# Patient Record
Sex: MALE | Race: WHITE | NOT HISPANIC OR LATINO | URBAN - METROPOLITAN AREA
[De-identification: names, ages, dates, MRNs, and addresses within clinical notes are randomized per-mention and may not be internally consistent; named-entity substitution may affect disease eponyms.]

---

## 2017-09-13 ENCOUNTER — EMERGENCY (EMERGENCY)
Facility: HOSPITAL | Age: 62
LOS: 1 days | Discharge: LEFT BEFORE DISCHARGE | End: 2017-09-13
Attending: EMERGENCY MEDICINE | Admitting: EMERGENCY MEDICINE
Payer: MEDICAID

## 2017-09-13 VITALS
OXYGEN SATURATION: 100 % | SYSTOLIC BLOOD PRESSURE: 125 MMHG | HEART RATE: 88 BPM | TEMPERATURE: 98 F | RESPIRATION RATE: 16 BRPM | DIASTOLIC BLOOD PRESSURE: 74 MMHG

## 2017-09-13 PROCEDURE — 99284 EMERGENCY DEPT VISIT MOD MDM: CPT | Mod: 25

## 2017-09-13 NOTE — ED ADULT TRIAGE NOTE - CHIEF COMPLAINT QUOTE
pt comes to ED from a bar. pt states he was drinking all night does not know how much he drank tonight. Also the patient needs psych meds refilled. pt does not have a psychiatrist. pt denies si/hi pt states I just need help VSS NAD

## 2017-09-14 VITALS
OXYGEN SATURATION: 100 % | DIASTOLIC BLOOD PRESSURE: 73 MMHG | HEART RATE: 84 BPM | SYSTOLIC BLOOD PRESSURE: 121 MMHG | RESPIRATION RATE: 16 BRPM

## 2017-09-14 RX ORDER — FAMOTIDINE 10 MG/ML
20 INJECTION INTRAVENOUS DAILY
Qty: 0 | Refills: 0 | Status: DISCONTINUED | OUTPATIENT
Start: 2017-09-14 | End: 2017-09-17

## 2017-09-14 RX ADMIN — Medication 30 MILLILITER(S): at 06:04

## 2017-09-14 RX ADMIN — FAMOTIDINE 20 MILLIGRAM(S): 10 INJECTION INTRAVENOUS at 06:03

## 2017-09-14 NOTE — ED PROVIDER NOTE - OBJECTIVE STATEMENT
05:05 Cherie att: 61M BIBEMS etoh requesting med refill. Etoh use, denies daily use, last drink 12 pack of beer 6-7pm. Reports h/o DT. Reports he ran out of seroquel, risperidone and prozac. Has no psychiatrist to refill his meds. Denies f, c, ha, blurry vision, cp, sob, abd pain, n/v/d, dysuria, muscle aches. Denies si/hi/ah/vh. 05:05 Cherie att: 61M BIBEMS etoh requesting med refill. Pmh htn/hypercholesterolemia/GERD/etoh abuse. Pt reports he has not had his psych meds for 2 days and generally drinks when he does not have them. Etoh use, denies daily use, last drink 12 pack of beer 6-7pm. Reports h/o DT. Reports he ran out of seroquel, risperidone and prozac. Has no psychiatrist to refill his meds. Denies f, c, ha, blurry vision, cp, sob, abd pain, n/v/d, dysuria, muscle aches. Denies si/hi/ah/vh.

## 2017-09-14 NOTE — ED PROVIDER NOTE - MEDICAL DECISION MAKING DETAILS
61M pmh htn, hypercholeserolemia, gerd, pysch hx on prozac/seroquel/risperidone, ETOH abuse requesting med refill. Pt reports he has not had his psych meds for 2 days and generally drinks when he does not have them. Last drink 6-7pm 9/13. Prior hx of WD/DT's-no clinical evidence of WD at this time.   -allow pt time to sober/reassess  -give resources for crisis/behavioral health resources so pt can get medications up-to-date  Peace Toro, PGY-1 EM

## 2017-09-14 NOTE — ED PROVIDER NOTE - ATTENDING CONTRIBUTION TO CARE
Dr. Crespo: I have personally seen and examined this patient at the bedside. I have fully participated in the care of this patient. I have reviewed all pertinent clinical information, including history, physical exam, plan and the Resident's note and agree except as noted. HPI above as by me. PE above as by me. Recent etoh ingestion, slightly unsteady, neg active withdrawl. Mild epigastric tenderness PLAN po pepcid, maalox, observe for clinical sobriety and reassess hpi and pe.

## 2017-09-14 NOTE — ED ADULT NURSE NOTE - OBJECTIVE STATEMENT
BIBEMS intoxicated, speech slurred but speaking in full sentences, a/ox4, received on stretcher fully clothes and in possession of his belongings. Denies pain or fall, no sign of injury or trauma noted. PERRLA. Admits to drinking today, unsure of how much, "just as much as usual." As per triage note patient needs a refill of psych medications, however does not see a psychiatrist. VSS. Safety maintained, needs attended, will continue to monitor.

## 2017-09-14 NOTE — ED PROVIDER NOTE - PROGRESS NOTE DETAILS
KARINA Boykin: pt received as sign out, sleeping comfortable, no complaints. Able to get up and ambulate around the emergency room. Will provide crisis hotline and metrocard for d/c KARINA Boykin: pt no in room, seen walking around ED, over headed x 2 no answer. likely eloped. pt clinically was seen sober. Bray: pt s/o to me by Dr hill to f/u with social work and give information for psych refill.  Pt sober.  no distress.  eloped.  never had Si, HI, delusions.

## 2017-09-14 NOTE — ED PROVIDER NOTE - PHYSICAL EXAMINATION
Cherie att: nad, aaox2, disheveled, malodorous, ncat, perrl, neg spinal tenderness, ctabl, rrr, s1s2, abd soft mild epigas tenderness, neg guard or rebound, neg le edema

## 2018-02-01 ENCOUNTER — OUTPATIENT (OUTPATIENT)
Dept: OUTPATIENT SERVICES | Facility: HOSPITAL | Age: 63
LOS: 1 days | End: 2018-02-01
Payer: MEDICAID

## 2018-02-01 PROCEDURE — G9001: CPT

## 2018-02-02 DIAGNOSIS — R69 ILLNESS, UNSPECIFIED: ICD-10-CM
